# Patient Record
Sex: MALE | Race: BLACK OR AFRICAN AMERICAN | Employment: FULL TIME | URBAN - METROPOLITAN AREA
[De-identification: names, ages, dates, MRNs, and addresses within clinical notes are randomized per-mention and may not be internally consistent; named-entity substitution may affect disease eponyms.]

---

## 2021-03-15 ENCOUNTER — HOSPITAL ENCOUNTER (OUTPATIENT)
Dept: PHYSICAL THERAPY | Age: 31
Discharge: HOME | End: 2021-03-15
Payer: COMMERCIAL

## 2021-03-15 PROCEDURE — 97161 PT EVAL LOW COMPLEX 20 MIN: CPT

## 2021-03-15 PROCEDURE — 97530 THERAPEUTIC ACTIVITIES: CPT

## 2021-03-15 PROCEDURE — 97110 THERAPEUTIC EXERCISES: CPT

## 2021-03-15 NOTE — PROGRESS NOTES
[facility]  [address]  [place], [address]  [phone] fax    Plan of Care/ Statement of Necessity for Physical Therapy Services    Patient name: Vikash Richardson Start of Care: 3/15/2021   Referral source: Noelle Chiu MD : 1990    Medical Diagnosis: Left shoulder pain [M25.512]  Payor: [facility] / Plan: [facility] PPO / Product Type: PPO /  Onset Date: ~1 year ago   Treatment Diagnosis: left shoulder pain   Prior Hospitalization: see medical history Provider#: [id number]   Medications: Verified on Patient summary List    Comorbidities: back pain, BMI >30   Prior Level of Function: no limitations, working out 3-4x/week      The Plan of Care and following information is based on the information from the initial evaluation. Assessment/ key information: Patient is a 26year old male with complaints of left shoulder pain beginning ~1 year ago after performing bench lift and feeling \"unstable\" with the weight. He reports no limitations prior to onset and was working out ~3-4x/week. Currently, he reports difficulty with reaching overhead and weakness through left UE and has not participated in usual weight lifting due to discomfort in left shoulder. He presents with the following impairments: decreased left shoulder internal rotation, hypertonicity through upper trap, pectoralis, and RTC musculature, decreased left shoulder IR/ER strength, and poor postural awareness. Patient naturally defaults to UT activation with shoulder ROM. He would benefit from skilled PT 2x/week for 2 weeks to address above limitations to promote return to prior level of function and perform daily activities with greater ease.     Evaluation Complexity History MEDIUM  Complexity : 1-2 comorbidities / personal factors will impact the outcome/ POC ; Examination MEDIUM Complexity : 3 Standardized tests and measures addressing body structure, function, activity limitation and / or participation in recreation  ;Presentation LOW Complexity : Stable, uncomplicated  ;Clinical Decision Making MEDIUM Complexity : FOTO score of 26-74  Overall Complexity Rating: LOW   Problem List: pain affecting function, decrease ROM, decrease strength, decrease ADL/ functional abilitiies, decrease activity tolerance, decrease flexibility/ joint mobility and decrease transfer abilities   Treatment Plan may include any combination of the following: Therapeutic exercise, Therapeutic activities, Neuromuscular re-education, Physical agent/modality, Manual therapy, Patient education, Self Care training, Functional mobility training and Home safety training  Patient / Family readiness to learn indicated by: asking questions, trying to perform skills and interest  Persons(s) to be included in education: patient (P)  Barriers to Learning/Limitations: None  Patient Goal (s): get back to normal - workouts without pain  Patient Self Reported Health Status: good  Rehabilitation Potential: good    Short Term Goals: To be accomplished in 1 treatments:  1. Patient will report performance of home exercise program 4 of 7 days in the next week demonstrating compliance to therapy program and progress towards independent management of symptoms. Long Term Goals: To be accomplished in 2 weeks:  1. Patient will report <3/10 pain at it's worst to improve overall functional mobility. 2. Patient will report at least 50% improvement of symptoms and demonstrate understanding of how to maintain while away from therapy on work trip. 3. Patient will report no pain with internal and external shoulder strength testing on left demonstrating 5/5 pain to improve ability to perform usual work duties. 4. Patient will report sleeping >/= 7 hours per night with waking up </= to 1x demonstrating improved quality of life.   5. Patient will improve FOTO score to 80 to demonstrate return to prior level of function and to improve patients ability to perform work and recreational tasks with greater ease. Frequency / Duration: Patient to be seen 2 times per week for 2 weeks. Patient/ Caregiver education and instruction: Diagnosis, prognosis, activity modification, exercises and other cold pack application for symptom management   [x]  Plan of care has been reviewed with GUSTAVO Mckeon PT, DPT 3/15/2021 1:55 PM    ________________________________________________________________________    I certify that the above Therapy Services are being furnished while the patient is under my care. I agree with the treatment plan and certify that this therapy is necessary.     [facility] Signature:____________Date:_________TIME:________     Quirino Cormier MD  ** Signature, Date and Time must be completed for valid certification **    Please sign and return to [facility] [address]  [address] [facility] [address]  [place], [facility] [address]  [phone] fax

## 2021-03-15 NOTE — PROGRESS NOTES
PT DAILY TREATMENT NOTE     Patient Name: Warden Leone  Date:3/15/2021  : 1990  [x]  Patient  Verified  Payor: BLUE CROSS / Plan: [facility] PPO / Product Type: PPO /    In time: 103  Out time:148  Total Treatment Time (min): 45  Visit #: 1 of 4    Medicare/BCBS Only   Total Timed Codes (min):  27 1:1 Treatment Time:  45       Treatment Area: Left shoulder pain [M25.512]    SUBJECTIVE  Pain Level (0-10 scale): 0  Any medication changes, allergies to medications, adverse drug reactions, diagnosis change, or new procedure performed?: [x] No    [] Yes (see summary sheet for update)  Subjective functional status/changes:   [] No changes reported  Patient reports originally injuring her left shoulder on the \"up\" phase of bench press ~1 year ago. He felt unstable in his left shoulder and had a  recover the bar for him. He reports he wasn't pushing increased weight but felt more fatigued that day. Since then, he has been limiting weight lifting due to left shoulder pain. He is right hand dominant and denies limitations prior to his injury. Currently, he reports aching to the anterior and lateral shoulder and difficulty with overhead activity and weakness through left UE. He reports improved symptoms with heat and ice and worsening symptoms with overuse. OBJECTIVE  18 min [x]Eval                  []Re-Eval       15 min Therapeutic Exercise:  [x] See flow sheet: Patient provided printed HEP to begin addressing impairments. Patient provided demonstration of exercises and rationale for each exercise to improve compliance to HEP. Education provided on importance of compliance to HEP for improved carry over between therapy session.    Rationale: increase ROM, increase strength and improve coordination to improve the patients ability to perform ADLs and self care tasks with greater ease      12 min Therapeutic Activity:  [x]  See flow sheet: Discussed pathology, treatment findings, and plan of care. Education provided on use of cold pack for symptom management and discussed posture with all activity to minimize UT involvement.    Rationale: improve coordination  to improve the patient’s ability to perform functional mobility with greater ease            With   [] TE   [] TA   [] neuro   [] other: Patient Education: [x] Review HEP    [] Progressed/Changed HEP based on:   [] positioning   [] body mechanics   [] transfers   [] heat/ice application    [] other:      Other Objective/Functional Measures: see paper chart for evaluation form      Pain Level (0-10 scale) post treatment: 0    ASSESSMENT/Changes in Function: Patient is a 30 year old male with complaints of left shoulder pain beginning ~1 year ago after performing bench lift and feeling \"unstable\" with the weight. He reports no limitations prior to onset and was working out ~3-4x/week. Currently, he reports difficulty with reaching overhead and weakness through left UE and has not participated in usual weight lifting due to discomfort in left shoulder. He presents with the following impairments: decreased left shoulder internal rotation, hypertonicity through upper trap, pectoralis, and RTC musculature, decreased left shoulder IR/ER strength, and poor postural awareness. Patient naturally defaults to UT activation with shoulder ROM. He would benefit from skilled PT 2x/week for 2 weeks to address above limitations to promote return to prior level of function and perform daily activities with greater ease.     Patient will continue to benefit from skilled PT services to modify and progress therapeutic interventions, address functional mobility deficits, address ROM deficits, address strength deficits, analyze and address soft tissue restrictions, analyze and cue movement patterns, analyze and modify body mechanics/ergonomics, assess and modify postural abnormalities, address imbalance/dizziness and instruct in home and community integration to  attain remaining goals. [x]  See Plan of Care  []  See progress note/recertification  []  See Discharge Summary         Progress towards goals / Updated goals:  Short Term Goals: To be accomplished in 1 treatments:  1. Patient will report performance of home exercise program 4 of 7 days in the next week demonstrating compliance to therapy program and progress towards independent management of symptoms. Eval: HEP provided     Long Term Goals: To be accomplished in 2 weeks:  1. Patient will report <3/10 pain at it's worst to improve overall functional mobility. Eval: 7-8/10 pain waking him up from his sleep   2. Patient will report at least 50% improvement of symptoms and demonstrate understanding of how to maintain while away from therapy on work trip. Eval: 0%  3. Patient will report no pain with internal and external shoulder strength testing on left demonstrating 5/5 pain to improve ability to perform usual work duties. Eval: 4+/5 with pain present   4. Patient will report sleeping >/= 7 hours per night with waking up </= to 1x demonstrating improved quality of life. Eval: Brink Brittle couple times a night due to shoulder pain\"   5. Patient will improve FOTO score to 80 to demonstrate return to prior level of function and to improve patients ability to perform work and recreational tasks with greater ease.    Eval: 80    PLAN  [x]  Upgrade activities as tolerated     []  Continue plan of care  []  Update interventions per flow sheet       []  Discharge due to:_  []  Other:_      Abhinav Bronson, PT, DPT 3/15/2021  1:55 PM    Future Appointments   Date Time Provider Tamika Valera   3/17/2021  8:15 AM Roma Mon, PT [facility]   3/22/2021  9:45 AM Elvira Turner, PT [facility]   4/19/2021 12:15 PM Stephen Beaulieu [facility]   4/21/2021 12:45 PM Juan Pablo Qureshi [facility]

## 2021-03-17 ENCOUNTER — HOSPITAL ENCOUNTER (OUTPATIENT)
Dept: PHYSICAL THERAPY | Age: 31
Discharge: HOME | End: 2021-03-17
Payer: COMMERCIAL

## 2021-03-17 PROCEDURE — 97110 THERAPEUTIC EXERCISES: CPT

## 2021-03-17 PROCEDURE — 97112 NEUROMUSCULAR REEDUCATION: CPT

## 2021-03-17 PROCEDURE — 97140 MANUAL THERAPY 1/> REGIONS: CPT

## 2021-03-17 NOTE — PROGRESS NOTES
PT DAILY TREATMENT NOTE     Patient Name: Trinh Briggs  Date:3/17/2021  : 1990  [x]  Patient  Verified  Payor: [employer]UE [employer] / Plan: [employer] Hart[employer] [id number] PPO / Product Type: PPO /    In time:819  Out time:914  Total Treatment Time (min): 55  Visit #: 2 of 4    Medicare/BCBS Only   Total Timed Codes (min):  55 1:1 Treatment Time:  55       Treatment Area: Left shoulder pain [M25.512]    SUBJECTIVE  Pain Level (0-10 scale): 2/10  Any medication changes, allergies to medications, adverse drug reactions, diagnosis change, or new procedure performed?: [x] No    [] Yes (see summary sheet for update)  Subjective functional status/changes:   [] No changes reported  \" Mainly pain when I try to do things with that arm\"    OBJECTIVE   _redness  adverse reaction:     15 min Therapeutic Exercise:  [x] See flow sheet : Issued HEP   Rationale: increase ROM and increase strength to improve the patients ability to return to functional Left UE movements. 30 min Neuromuscular Re-education:  [x]  See flow sheet : Nuetral scapula setting, Postural and positional training. Rationale: increase strength, improve coordination and increase proprioception  to improve the patients ability to stabilize scapula with ADL, functional movement patterns. 10 min Manual Therapy:  STM/MFR to Left UT, supra, biceps tendon. Posterior capsule stretching, AP/INF joint mobilization. The manual therapy interventions were performed at a separate and distinct time from the therapeutic activities interventions. Rationale: decrease pain, increase tissue extensibility and increase postural awareness to improve pain free functional movement patterns.              With   [x] TE   [] TA   [] neuro   [] other: Patient Education: [x] Review HEP    [] Progressed/Changed HEP based on:   [] positioning   [] body mechanics   [] transfers   [] heat/ice application    [] other:      Other Objective/Functional Measures: Poor scaphumeral rhythm, Scapula winging with ER  Pain with resisted Shoulder flx pre test, negative pain post session    Pain Level (0-10 scale) post treatment: 0/10    ASSESSMENT/Changes in Function: Patient has poor scaphumeral rhythm with overuse of upper trap to elevate arm and stabilize. With increased load on isometric ER patient compensates with UT involvement. Patient has pain with prone Y at this time. Patient demonstrates understanding of shoulder setting despite challenge. Patient will continue to benefit from skilled PT services to modify and progress therapeutic interventions, address functional mobility deficits, address ROM deficits, address strength deficits, analyze and address soft tissue restrictions, analyze and modify body mechanics/ergonomics and assess and modify postural abnormalities to attain remaining goals. [x]  See Plan of Care  []  See progress note/recertification  []  See Discharge Summary         Progress towards goals / Updated goals:  Short Term Goals: To be accomplished in 1 treatments:  1. Patient will report performance of home exercise program 4 of 7 days in the next week demonstrating compliance to therapy program and progress towards independent management of symptoms. Eval: HEP provided      Long Term Goals: To be accomplished in 2 weeks:  1. Patient will report <3/10 pain at it's worst to improve overall functional mobility. Eval: 7-8/10 pain waking him up from his sleep   2. Patient will report at least 50% improvement of symptoms and demonstrate understanding of how to maintain while away from therapy on work trip. Eval: 0%  3. Patient will report no pain with internal and external shoulder strength testing on left demonstrating 5/5 pain to improve ability to perform usual work duties. Eval: 4+/5 with pain present   4.  Patient will report sleeping >/= 7 hours per night with waking up </= to 1x demonstrating improved quality of life. Eval: Ricco Sanon couple times a night due to shoulder pain\"   5. Patient will improve FOTO score to 80 to demonstrate return to prior level of function and to improve patients ability to perform work and recreational tasks with greater ease.               Eval: 80    PLAN  []  Upgrade activities as tolerated     [x]  Continue plan of care  []  Update interventions per flow sheet       []  Discharge due to:_  []  Other:_      Butch Vergara, PT 3/17/2021  7:24 AM    Future Appointments   Date Time Provider Tamika Valera   3/17/2021  8:15 AM Piper German, PT [facility]   3/22/2021  9:45 AM Sasha Klein, PT [facility]   4/19/2021 12:15 PM Naveed Bello [facility]   4/21/2021 12:45 PM Tyra Major [facility]

## 2021-03-22 ENCOUNTER — HOSPITAL ENCOUNTER (OUTPATIENT)
Dept: PHYSICAL THERAPY | Age: 31
Discharge: HOME | End: 2021-03-22
Payer: COMMERCIAL

## 2021-03-22 PROCEDURE — 97140 MANUAL THERAPY 1/> REGIONS: CPT

## 2021-03-22 PROCEDURE — 97112 NEUROMUSCULAR REEDUCATION: CPT

## 2021-03-22 PROCEDURE — 97110 THERAPEUTIC EXERCISES: CPT

## 2021-03-22 NOTE — PROGRESS NOTES
PT DAILY TREATMENT NOTE     Patient Name: Kaylynn Damon  Date:3/22/2021  : 1990  [x]  Patient  Verified  Payor: BLUE CROSS / Plan: [employer] Hart[employer] [id number] PPO / Product Type: PPO /    In time: 8:48 AM  Out time: 10:31 AM  Total Treatment Time (min): 43  Visit #: 3 of 4    Medicare/BCBS Only   Total Timed Codes (min):  43 1:1 Treatment Time:  43       Treatment Area: Left shoulder pain [M25.512]    SUBJECTIVE  Pain Level (0-10 scale): 0  Any medication changes, allergies to medications, adverse drug reactions, diagnosis change, or new procedure performed?: [x] No    [] Yes (see summary sheet for update)  Subjective functional status/changes:   [] No changes reported  No pain today but experienced discomfort over the weekend after \"tweaking\" it this weekend. Improved with rest and ice. OBJECTIVE      15 min Therapeutic Exercise:  [x] See flow sheet :   Rationale: increase ROM and increase strength to improve the patients ability to increase functional capacity of left UE.     20 min Neuromuscular Re-education:  [x]  See flow sheet :   Rationale: increase ROM, increase strength and improve coordination  to improve the patients ability to maintain upper quarter stability in ADLs for greater ease. 8 min Manual Therapy:  Supine - left GHJ anterior/inferior mobilizations Grade III in 90 and 140 degrees elevation   The manual therapy interventions were performed at a separate and distinct time from the therapeutic activities interventions. Rationale: decrease pain, increase ROM and increase tissue extensibility to improve pain free reaching ability. With   [] TE   [] TA   [] neuro   [] other: Patient Education: [x] Review HEP    [] Progressed/Changed HEP based on:   [] positioning   [] body mechanics   [] transfers   [] heat/ice application    [] other:      Other Objective/Functional Measures: Cues for scap depression/retraction with exercises.      Pain Level (0-10 scale) post treatment: 0    ASSESSMENT/Changes in Function: Extensive education on scapular setting in functional and recreational activities including therapeutic activities in clinic; patient reports improvement in symptoms with movement and demonstrates understanding of scapulothoracic stability mechanics. Patient will continue to benefit from skilled PT services to modify and progress therapeutic interventions, address functional mobility deficits, address ROM deficits, address strength deficits, analyze and address soft tissue restrictions, analyze and cue movement patterns and analyze and modify body mechanics/ergonomics to attain remaining goals. Progress towards goals / Updated goals:  Short Term Goals: To be accomplished in 1 treatments:  1. Patient will report performance of home exercise program 4 of 7 days in the next week demonstrating compliance to therapy program and progress towards independent management of symptoms.                Eval: HEP provided      Long Term Goals: To be accomplished in 2 weeks:  1. Patient will report <3/10 pain at it's worst to improve overall functional mobility.               Eval: 7-8/10 pain waking him up from his sleep   2. Patient will report at least 50% improvement of symptoms and demonstrate understanding of how to maintain while away from therapy on work trip.                Eval: 0%  3. Patient will report no pain with internal and external shoulder strength testing on left demonstrating 5/5 pain to improve ability to perform usual work duties.                Eval: 4+/5 with pain present   4.  Patient will report sleeping >/= 7 hours per night with waking up </= to 1x demonstrating improved quality of life.              Eval: Carla Capps couple times a night due to shoulder pain\"   5. Patient will improve FOTO score to 80 to demonstrate return to prior level of function and to improve patients ability to perform work and recreational tasks with greater ease.              Eval: 80       PLAN  []  Upgrade activities as tolerated     [x]  Continue plan of care  []  Update interventions per flow sheet       []  Discharge due to:_  []  Other:_      Gutierrez Garcia, PT 3/22/2021  9:52 AM    Future Appointments   Date Time Provider Tamika Valera   4/19/2021 12:15 PM Clint JEONG   4/21/2021 12:45 PM [facility]

## 2021-04-19 ENCOUNTER — HOSPITAL ENCOUNTER (OUTPATIENT)
Dept: PHYSICAL THERAPY | Age: 31
Discharge: HOME | End: 2021-04-19
Payer: COMMERCIAL

## 2021-04-19 PROCEDURE — 97110 THERAPEUTIC EXERCISES: CPT

## 2021-04-19 PROCEDURE — 97112 NEUROMUSCULAR REEDUCATION: CPT

## 2021-04-19 NOTE — PROGRESS NOTES
PT DAILY TREATMENT NOTE     Patient Name: Lynette De La Rosa  Date:2021  : 1990  [x]  Patient  Verified  Payor: Doron Ortaling / Plan: Rene Hartman [id number] PPO / Product Type: PPO /    In time:1215  Out time: 1259  Total Treatment Time (min): 44  Visit #: 1 of 4    Medicare/BCBS Only   Total Timed Codes (min):  44 1:1 Treatment Time:  44       Treatment Area: Left shoulder pain [M25.512]    SUBJECTIVE  Pain Level (0-10 scale): 2  Any medication changes, allergies to medications, adverse drug reactions, diagnosis change, or new procedure performed?: [x] No    [] Yes (see summary sheet for update)  Subjective functional status/changes:   [] No changes reported  Patient reports he was able to tolerate exercises well while underway. He reports seeing his primary care MD before this visit in which they reviewed X-ray of the shoulder which reported bone spurs. He reports he is seeing an orthopedic MD tomorrow.      OBJECTIVE    19 min Therapeutic Exercise:  [x] See flow sheet :   Rationale: increase ROM, increase strength and improve coordination to improve the patients ability to perform ADLs and self care tasks with greater ease     25 min Neuromuscular Re-education:  [x]  See flow sheet: farmer's carry, OH carry, scapular clocks, planks on shuttle balance, joceline scapular stabilization series, prone scapular stabilization on SB, shoulder taps in plank position    Rationale: increase strength, improve coordination and increase proprioception  to improve the patients ability to perform functional mobility with improved ease and control         With   [] TE   [] TA   [] neuro   [] other: Patient Education: [x] Review HEP    [] Progressed/Changed HEP based on:   [] positioning   [] body mechanics   [] transfers   [] heat/ice application    [] other:      Other Objective/Functional Measures: 4+/5 external rotation strength with mild discomfort, 5-/5 internal rotation strength      Pain Level (0-10 scale) post treatment: 0    ASSESSMENT/Changes in Function: Patient presents for progress note today. He reports compliance to HEP which has helped significantly with reaching some long term goals. Patient reports ~80% improvement in overall symptoms with no difficulty performing household activities but has some difficulty with work tasks. He reports difficulty lifting and reaching overhead at times and is still fearful of returning to his normal workout regimen due to interfering with his progress thus far. Patient tolerated higher level activities today without pain in left shoulder. He would benefit from continued skilled PT 2x/week for 2 more weeks to build stability in shoulder and scapular stabilizers and improve overall functional mobility to promote return to PLOF. Patient will continue to benefit from skilled PT services to modify and progress therapeutic interventions, address functional mobility deficits, address ROM deficits, address strength deficits, analyze and address soft tissue restrictions, analyze and cue movement patterns, analyze and modify body mechanics/ergonomics, assess and modify postural abnormalities, address imbalance/dizziness and instruct in home and community integration to attain remaining goals. []  See Plan of Care  [x]  See progress note/recertification  []  See Discharge Summary         Progress towards goals / Updated goals:  Short Term Goals: To be accomplished in 1 treatments:  1. Patient will report performance of home exercise program 4 of 7 days in the next week demonstrating compliance to therapy program and progress towards independent management of symptoms.                Eval: HEP provided    Current: Met 4/19/2021 - reports compliance      Long Term Goals: To be accomplished in 2 weeks:  1.  Patient will report <3/10 pain at it's worst to improve overall functional mobility.               Eval: 7-8/10 pain waking him up from his sleep    Current: Progressing 4/19/2021 - 5/10 at worst when moving an engine part   2. Patient will report at least 50% improvement of symptoms and demonstrate understanding of how to maintain while away from therapy on work trip.                Eval: 0%   Current: Met 4/19/2021 - ~80% improvement  3. Patient will report no pain with internal and external shoulder strength testing on left demonstrating 5/5 pain to improve ability to perform usual work duties.                Eval: 4+/5 with pain present    Current: Progressing 4/19/2021 - 4+/5 external rotation strength with mild discomfort, 5-/5 internal rotation strength   4. Patient will report sleeping >/= 7 hours per night with waking up </= to 1x demonstrating improved quality of life.              Eval: Pansy Sybil couple times a night due to shoulder pain\"    Current: Met 4/19/2021 - waking 1x/night for pain    5.  Patient will improve FOTO score to 80 to demonstrate return to prior level of function and to improve patients ability to perform work and recreational tasks with greater ease.              Eval: 74   Current: Progressing 4/19/2021 - 75     PLAN  [x]  Upgrade activities as tolerated     []  Continue plan of care  []  Update interventions per flow sheet       []  Discharge due to:_  []  Other:_      Anahi Urias, PT, DPT 4/19/2021  12:19 PM    Future Appointments   Date Time Provider Tamika Valera   4/21/2021 12:45 PM Wilber Mars [id number]

## 2021-04-19 NOTE — PROGRESS NOTES
[facility]  [address] [facility] [address]  [place], [address]  [phone] fax    Physical Therapy Progress Note  Patient name: Markus Ganser Start of Care: 3/15/2021   Referral source: Higinio Claire MD : 1990   Medical/Treatment Diagnosis: Left shoulder pain [M25.512]  Payor: BLUE [place] / Plan: [facility] PPO / Product Type: PPO /  Onset Date: ~1 year ago     Prior Hospitalization: see medical history Provider#: [id number]   Medications: Verified on Patient Summary List     Comorbidities: back pain, BMI >30   Prior Level of Function: no limitations, working out 3-4x/week   Visits from Start of Care: 4    Missed Visits: 0      Established Goals:          Short Term Goals: To be accomplished in 1 treatments:  1. Patient will report performance of home exercise program 4 of 7 days in the next week demonstrating compliance to therapy program and progress towards independent management of symptoms.                Eval: HEP provided               Current: Met - reports compliance      Long Term Goals: To be accomplished in 2 weeks:  1. Patient will report <3/10 pain at it's worst to improve overall functional mobility.               Eval: 7-8/10 pain waking him up from his sleep               Current: Progressing - 5/10 at worst when moving an engine part   2. Patient will report at least 50% improvement of symptoms and demonstrate understanding of how to maintain while away from therapy on work trip.                Eval: 0%              Current: Met - ~80% improvement  3. Patient will report no pain with internal and external shoulder strength testing on left demonstrating 5/5 pain to improve ability to perform usual work duties.                Eval: 4+/5 with pain present               Current: Progressing - 4+/5 external rotation strength with mild discomfort, 5-/5 internal rotation strength   4.  Patient will report sleeping >/= 7 hours per night with waking up </= to 1x demonstrating improved quality of life.              Eval: \"a couple times a night due to shoulder pain\"               Current: Met - waking 1x/night for pain    5. Patient will improve FOTO score to 80 to demonstrate return to prior level of function and to improve patients ability to perform work and recreational tasks with greater ease.              Eval: 74              Current: Progressing - 75     Key Functional Changes: improved scapular stabilization, improved posture, improved shoulder strength, improved sleep tolerance     Updated Goals: to be achieved in 2 weeks:  1. Patient will report <3/10 pain at it's worst to improve overall functional mobility.               PN: 5/10 at worst when moving an engine part   2. Patient will report no pain with internal and external shoulder strength testing on left demonstrating 5/5 pain to improve ability to perform usual work duties.                PN: 4+/5 external rotation strength with mild discomfort, 5-/5 internal rotation strength   3. Patient will improve FOTO score to 80 to demonstrate return to prior level of function and to improve patients ability to perform work and recreational tasks with greater ease.              PN: 75   4. Patient will report <1-2/10 pain with full workout to promote return to PLOF. PN: fearful of returning to gym at this time    ASSESSMENT/RECOMMENDATIONS: Patient presents for progress note today. He reports compliance to HEP which has helped significantly with reaching some long term goals. Patient reports ~80% improvement in overall symptoms with no difficulty performing household activities but has some difficulty with work tasks. He reports difficulty lifting and reaching overhead at times and is still fearful of returning to his normal workout regimen due to interfering with his progress thus far. Patient tolerated higher level activities today without pain in left shoulder.  He would benefit from continued skilled PT 2x/week for 2 more weeks to build stability in shoulder and scapular stabilizers and improve overall functional mobility to promote return to PLOF. [x]Continue therapy per initial plan/protocol at a frequency of  2 x per week for 2 weeks  []Continue therapy with the following recommended changes:_____________________      _____________________________________________________________________  []Discontinue therapy progressing towards or have reached established goals  []Discontinue therapy due to lack of appreciable progress towards goals  []Discontinue therapy due to lack of attendance or compliance  []Await Physician's recommendations/decisions regarding therapy  []Other:________________________________________________________________    Thank you for this referral.    Fartun Mullen PT, DPT 4/19/2021 12:33 PM  NOTE TO PHYSICIAN:  PLEASE COMPLETE THE ORDERS BELOW AND   FAX TO [facility]: [phone]  If you are unable to process this request in 24 hours please contact our office: [phone]    ? I have read the above report and request that my patient continue as recommended. ? I have read the above report and request that my patient continue therapy with the following changes/special instructions:__________________________________________________________  ? I have read the above report and request that my patient be discharged from therapy.     Physicians signature: ______________________________Date: ______Time:______     Sarah Rahman MD

## 2021-04-21 ENCOUNTER — HOSPITAL ENCOUNTER (OUTPATIENT)
Dept: PHYSICAL THERAPY | Age: 31
Discharge: HOME | End: 2021-04-21
Payer: COMMERCIAL

## 2021-04-21 PROCEDURE — 97110 THERAPEUTIC EXERCISES: CPT

## 2021-04-21 PROCEDURE — 97112 NEUROMUSCULAR REEDUCATION: CPT

## 2021-04-21 NOTE — PROGRESS NOTES
PT DAILY TREATMENT NOTE     Patient Name: Priti Mercedes  Date:2021  : 1990  [x]  Patient  Verified  Payor: BLUE [employer]OSS / Plan: Rene Hartman [id number] PPO / Product Type: PPO /    In time:1246  Out time:1140  Total Treatment Time (min): 47  Visit #: 2 of 4    Medicare/BCBS Only   Total Timed Codes (min):  44 1:1 Treatment Time:  44       Treatment Area: Left shoulder pain [M25.512]    SUBJECTIVE  Pain Level (0-10 scale): 3-4  Any medication changes, allergies to medications, adverse drug reactions, diagnosis change, or new procedure performed?: [x] No    [] Yes (see summary sheet for update)  Subjective functional status/changes:   [] No changes reported  Patient reports he met with the ortho MD yesterday who requested he receive an MRI which will be scheduled before he travels for work again. OBJECTIVE    17 min Therapeutic Exercise:  [x] See flow sheet :   Rationale: increase ROM, increase strength and improve coordination to improve the patients ability to perform ADLs and self care tasks with greater ease      27 min Neuromuscular Re-education:  [x]  See flow sheet: Farmer's carry, OH carry, shuttle balance rocking, plank shoulder taps, scapular clocks in plank position, quadruped series on pilates chair, prone scapular stabilization series on SB      Rationale: increase strength, improve coordination and increase proprioception  to improve the patients ability to perform functional mobility with greater stabilization and control           With   [] TE   [] TA   [] neuro   [] other: Patient Education: [x] Review HEP    [] Progressed/Changed HEP based on:   [] positioning   [] body mechanics   [] transfers   [] heat/ice application    [] other:         Pain Level (0-10 scale) post treatment: 0    ASSESSMENT/Changes in Function: Patient challenged with progression of scapular clocks to plank position to further challenge shoulder stability.  Patient challenged with quadruped series on Pilates chair requiring TC to focus on proper stabilization. Attempted 90-90 external rotation with [place] resulting in mild increase in symptoms, therefore held on remaining exercises. Trial of cold pack after therapy session to decrease soreness and minimize inflammation. He reports feeling \"better\" after cold pack application. Patient encouraged to try workout independently and monitor symptoms. Patient will continue to benefit from skilled PT services to modify and progress therapeutic interventions, address functional mobility deficits, address ROM deficits, address strength deficits, analyze and address soft tissue restrictions, analyze and cue movement patterns, analyze and modify body mechanics/ergonomics, assess and modify postural abnormalities, address imbalance/dizziness and instruct in home and community integration to attain remaining goals. []  See Plan of Care  [x]  See progress note/recertification  []  See Discharge Summary         Progress towards goals / Updated goals:  1. Patient will report <3/10 pain at it's worst to improve overall functional mobility.               PN: 5/10 at worst when moving an engine part   2. Patient will report no pain with internal and external shoulder strength testing on left demonstrating 5/5 pain to improve ability to perform usual work duties.                PN: 4+/5 external rotation strength with mild discomfort, 5-/5 internal rotation strength   3. Patient will improve FOTO score to 80 to demonstrate return to prior level of function and to improve patients ability to perform work and recreational tasks with greater ease.              PN: 75   4. Patient will report <1-2/10 pain with full workout to promote return to PLOF.                PN: fearful of returning to gym at this time    PLAN  [x]  Upgrade activities as tolerated     []  Continue plan of care  []  Update interventions per flow sheet       []  Discharge due to:_  []  Other:_      Sudha Stevens Lanye Cochran, PT, DPT 4/21/2021  12:54 PM    Future Appointments   Date Time Provider Tamika Valera   4/30/2021  3:00 PM [facility]

## 2021-04-30 ENCOUNTER — HOSPITAL ENCOUNTER (OUTPATIENT)
Dept: PHYSICAL THERAPY | Age: 31
Discharge: HOME | End: 2021-04-30
Payer: COMMERCIAL

## 2021-04-30 PROCEDURE — 97112 NEUROMUSCULAR REEDUCATION: CPT

## 2021-04-30 PROCEDURE — 97110 THERAPEUTIC EXERCISES: CPT

## 2021-04-30 NOTE — PROGRESS NOTES
PT DAILY TREATMENT NOTE     Patient Name: Jacinto Daniel  Date:2021  : 1990  [x]  Patient  Verified  Payor: Antoine Barajas / Plan: Rene Hartman [id number] PPO / Product Type: PPO /    In time: 300  Out time:346  Total Treatment Time (min): 46  Visit #: 3 of 4    Medicare/BCBS Only   Total Timed Codes (min):  46 1:1 Treatment Time:  46       Treatment Area: Left shoulder pain [M25.512]    SUBJECTIVE  Pain Level (0-10 scale): 2  Any medication changes, allergies to medications, adverse drug reactions, diagnosis change, or new procedure performed?: [x] No    [] Yes (see summary sheet for update)  Subjective functional status/changes:   [] No changes reported  Patient reports he completed a full workout in the gym and did very well. He only had discomfort with supine bench press but was able to perform exercise in reclined position without difficulty.      OBJECTIVE    29 min Therapeutic Exercise:  [x] See flow sheet : reassessment of goals and review of updated HEP    Rationale: increase ROM, increase strength and improve coordination to improve the patients ability to perform ADLs and self care tasks with greater ease     17 min Neuromuscular Re-education:  [x]  See flow sheet: SA wall slide, scapular clocks in plank, shoulder taps in plank, pilates chair quadruped series, Farmer's carry, OH carry, shuttle balance series    Rationale: increase strength, improve coordination and increase proprioception  to improve the patients ability to perform functional mobility with improved scapular stabilization and control         With   [] TE   [] TA   [] neuro   [] other: Patient Education: [x] Review HEP    [] Progressed/Changed HEP based on:   [] positioning   [] body mechanics   [] transfers   [] heat/ice application    [] other:      Other Objective/Functional Measures: FOTO score 80, left internal/external shoulder strength 5/5      Pain Level (0-10 scale) post treatment: 2    ASSESSMENT/Changes in Function: Patient reports ~85% improvement in overall symptoms to left shoulder. He has returned to working out and has demonstrated good tolerance with all activities. Patient demonstrates improved external and internal strength on the left shoulder. Patient provided with updated HEP today to continue improving scapular strength and shoulder strength as tolerated. Encouraged patient to attend to posture with all exercises in the gym and progress as tolerated. Patient provided with appropriate therabands to progress exercises as needed. Patient with no further questions or concerns at this time. Thank you for this referral.        []  See Plan of Care  []  See progress note/recertification  [x]  See Discharge Summary         Progress towards goals / Updated goals:  1. Patient will report <3/10 pain at it's worst to improve overall functional mobility.               PN: 5/10 at worst when moving an engine part    Current: Progressing 4/30/2021 - 5/10 with laying on that side    2. Patient will report no pain with internal and external shoulder strength testing on left demonstrating 5/5 pain to improve ability to perform usual work duties.                PN: 4+/5 external rotation strength with mild discomfort, 5-/5 internal rotation strength    Current: Met 4/30/2021 - 5/5 external and internal strength of left shoulder   3. Patient will improve FOTO score to 80 to demonstrate return to prior level of function and to improve patients ability to perform work and recreational tasks with greater ease.              PN: 75    Current: Met 4/30/2021 - 80  4. Patient will report <1-2/10 pain with full workout to promote return to PLOF.                SY: fearful of returning to gym at this time   Current: Progressing 4/30/2021 - 4/10 with supine bench press of 105#, 0/10 pain with remainder of workout     PLAN  []  Upgrade activities as tolerated     []  Continue plan of care  []  Update interventions per flow sheet [x]  Discharge due to: met and progressing towards long term goals   []  Other:_      Jefry Brunson, PT, DPT 4/30/2021  3:10 PM    No future appointments.

## 2021-04-30 NOTE — PROGRESS NOTES
Physical Therapy Discharge Instructions      [facility]  [address]  [place], [address]  [phone] fax    Patient: Fawad Velasco  : 1990      Continue Home Exercise Program 1 times per day for 2 weeks, then decrease to 4 times per week      Continue with    [x] Ice  as needed 1-2 times per day     [] Heat           Follow up with MD:     [] Upon completion of therapy     [x] As needed      Recommendations:     [x]   Return to activity with home program    []   Return to activity with the following modifications:       []Post Rehab Program    []Join Independent aquatic program     []Return to/join local gym            Cornell Goddard, PT, DPT 2021 3:37 PM

## 2021-04-30 NOTE — PROGRESS NOTES
[facility]  [address]  [place], [address]  [phone] fax    Physical Therapy Discharge Summary  Patient name: Ulysses Petit Start of Care: 3/15/2021   Referral source: Kaveh Dempsey MD : 1990   Medical/Treatment Diagnosis: Left shoulder pain [M25.512]  Payor: [email or web address] / Plan: [facility] PPO / Product Type: PPO /  Onset Date: ~1 year ago     Prior Hospitalization: see medical history Provider#: [id number]   Medications: Verified on Patient Summary List     Comorbidities: back pain, BMI >30   Prior Level of Function: no limitations, working out 3-4x/week   Visits from Start of Care: 6    Missed Visits: 0  Reporting Period : 2021 to 2021      Summary of Care:  1. Patient will report <3/10 pain at it's worst to improve overall functional mobility.               PN: 5/10 at worst when moving an engine part    Current: Progressing - 5/10 with laying on that side    2. Patient will report no pain with internal and external shoulder strength testing on left demonstrating 5/5 pain to improve ability to perform usual work duties.                PN: 4+/5 external rotation strength with mild discomfort, 5-/5 internal rotation strength    Current: Met - 5/5 external and internal strength of left shoulder   3. Patient will improve FOTO score to 80 to demonstrate return to prior level of function and to improve patients ability to perform work and recreational tasks with greater ease.              PN: 75    Current: Met  - 80  4. Patient will report <1-2/10 pain with full workout to promote return to PLOF.             PN: fearful of returning to gym at this time   Current: Progressing - 4/10 with supine bench press of 105#, 0/10 pain with remainder of workout       ASSESSMENT/RECOMMENDATIONS: Patient reports ~85% improvement in overall symptoms to left shoulder.  He has returned to working out and has demonstrated good tolerance with all activities. Patient demonstrates improved external and internal strength on the left shoulder. He has met 2 of 4 long term goals and is progressing well towards the remaining goals. Patient provided with updated HEP today to continue improving scapular strength and shoulder strength as tolerated. Encouraged patient to attend to posture with all exercises in the gym and progress as tolerated. Patient provided with appropriate therabands to progress exercises as needed. Patient with no further questions or concerns at this time.  Thank you for this referral.   [x]Discontinue therapy: [x]Patient has reached or is progressing toward set goals      []Patient is non-compliant or has abdicated      []Due to lack of appreciable progress towards set goals    Anselmo Greer, PT, DPT 4/30/2021 3:36 PM